# Patient Record
Sex: MALE | URBAN - METROPOLITAN AREA
[De-identification: names, ages, dates, MRNs, and addresses within clinical notes are randomized per-mention and may not be internally consistent; named-entity substitution may affect disease eponyms.]

---

## 2021-03-19 ENCOUNTER — OFFICE VISIT (OUTPATIENT)
Dept: URBAN - METROPOLITAN AREA CLINIC 18 | Facility: CLINIC | Age: 21
End: 2021-03-19
Payer: COMMERCIAL

## 2021-03-19 DIAGNOSIS — H16.141 PUNCTATE KERATITIS, RIGHT EYE: Primary | ICD-10-CM

## 2021-03-19 PROCEDURE — 99203 OFFICE O/P NEW LOW 30 MIN: CPT | Performed by: STUDENT IN AN ORGANIZED HEALTH CARE EDUCATION/TRAINING PROGRAM

## 2021-03-19 ASSESSMENT — INTRAOCULAR PRESSURE
OS: 12
OD: 12

## 2021-03-19 NOTE — IMPRESSION/PLAN
Impression: Punctate keratitis, right eye: H16.141. Plan: Discussed findings, no foreign body or abrasion noted. Rx artificial tears QID OU. RTC if symptoms persist/worsen. Workers comp form filled out.